# Patient Record
Sex: MALE | URBAN - METROPOLITAN AREA
[De-identification: names, ages, dates, MRNs, and addresses within clinical notes are randomized per-mention and may not be internally consistent; named-entity substitution may affect disease eponyms.]

---

## 2019-04-17 ENCOUNTER — OFFICE VISIT (OUTPATIENT)
Dept: FAMILY MEDICINE | Facility: CLINIC | Age: 34
End: 2019-04-17
Payer: COMMERCIAL

## 2019-04-17 VITALS
DIASTOLIC BLOOD PRESSURE: 84 MMHG | TEMPERATURE: 98.6 F | HEART RATE: 104 BPM | SYSTOLIC BLOOD PRESSURE: 158 MMHG | HEIGHT: 73 IN | BODY MASS INDEX: 31.68 KG/M2 | WEIGHT: 239 LBS

## 2019-04-17 DIAGNOSIS — Z11.3 SCREEN FOR STD (SEXUALLY TRANSMITTED DISEASE): Primary | ICD-10-CM

## 2019-04-17 PROCEDURE — 87491 CHLMYD TRACH DNA AMP PROBE: CPT | Performed by: NURSE PRACTITIONER

## 2019-04-17 PROCEDURE — 99203 OFFICE O/P NEW LOW 30 MIN: CPT | Performed by: NURSE PRACTITIONER

## 2019-04-17 PROCEDURE — 36415 COLL VENOUS BLD VENIPUNCTURE: CPT | Performed by: NURSE PRACTITIONER

## 2019-04-17 PROCEDURE — 87591 N.GONORRHOEAE DNA AMP PROB: CPT | Performed by: NURSE PRACTITIONER

## 2019-04-17 PROCEDURE — 86694 HERPES SIMPLEX NES ANTBDY: CPT | Performed by: NURSE PRACTITIONER

## 2019-04-17 RX ORDER — LISINOPRIL 10 MG/1
TABLET ORAL
COMMUNITY
Start: 2019-01-18

## 2019-04-17 ASSESSMENT — MIFFLIN-ST. JEOR: SCORE: 2087.98

## 2019-04-17 NOTE — PROGRESS NOTES
"  SUBJECTIVE:   Salomón Temple is a 33 year old male who presents to clinic today for the following   health issues:    Concern - Pt is here to establish care and check for STDs.  Not having any sx's, wants to make sure     HPI: Salomón presents today for STD testing. He was in [place] last week and had an unprotected sexual encounter with an individual whose STI status and sexual history isn't entirely clear. He denies obvious symptoms, but he states that he may feel a \"dull ache\" at the tip of his penis, but he is quite nervous and states that this may be in his imagination. He denies sores / ulcers on his genitals, as well as dysuria or any discharge. He also denies fever, chills, or other constitutional symptoms.       Additional history: as documented    Reviewed  and updated as needed this visit by clinical staff  Tobacco  Allergies  Meds  Problems  Med Hx  Surg Hx  Fam Hx  Soc Hx          Reviewed and updated as needed this visit by Provider  Tobacco  Allergies  Meds  Problems  Med Hx  Surg Hx  Fam Hx  Soc Hx          Patient Active Problem List   Diagnosis     Right wrist injury     CARDIOVASCULAR SCREENING; LDL GOAL LESS THAN 160     Warts     Past Surgical History:   Procedure Laterality Date     HERNIA REPAIR      age 11       Social History     Tobacco Use     Smoking status: Never Smoker     Smokeless tobacco: Never Used   Substance Use Topics     Alcohol use: Yes     Comment: 1 drink per month     Family History   Problem Relation Age of Onset     Heart Disease Maternal Grandfather      Dementia Paternal Grandmother      Hypertension Brother            ROS:  Constitutional, HEENT, cardiovascular, pulmonary, GI, , musculoskeletal, neuro, skin, endocrine and psych systems are negative, except as otherwise noted.    OBJECTIVE:     /84   Pulse 104   Temp 98.6  F (37  C) (Tympanic)   Ht 1.854 m (6' 1\")   Wt 108.4 kg (239 lb)   BMI 31.53 kg/m    Body mass index is 31.53 " kg/m .  GENERAL: healthy, alert and no distress   (male): normal male genitalia without lesions or urethral discharge, no hernia or suspicious skin manifestations  SKIN: no suspicious lesions or rashes  NEURO: Normal strength and tone, mentation intact and speech normal  PSYCH: mentation appears normal, affect normal/bright though he is obvious anxious    Diagnostic Test Results:  No results found for this or any previous visit (from the past 24 hour(s)).    ASSESSMENT/PLAN:     Salomón was seen today for establish care and std. New patient. Histories updated. Discussed, at length, the infectious processes of which he is at risk. We also discussed how Hepatitis B, syphilis, and HIV assays would likely return with negative results due to recency of potential exposure. He agrees to follow up after one month should he wish to have these checked. For now, will screen gonorrhea, chlamydia, and HSV IgM. Will follow up with him after the results of these studies are know. Agrees with plan, and all questions answered.     BP elevated, but he states that it is due to nervousness / anxiety surrounding this visit. He states that he checks this at home and reliably gets readings around 130 / 80. Declines offer to intensify his therapy today.     Diagnoses and all orders for this visit:    Screen for STD (sexually transmitted disease)  -     NEISSERIA GONORRHOEA PCR  -     CHLAMYDIA TRACHOMATIS PCR  -     Herpes: HSV IgM antibody      See Patient Instructions    Dennis Jha NP  [facility] [place]

## 2019-04-17 NOTE — PATIENT INSTRUCTIONS
I'll send you a [employer] message with results  Come back for HIV, Hep B, and syphilis, if you wish (at least one month)

## 2019-04-18 LAB
C TRACH DNA SPEC QL NAA+PROBE: NEGATIVE
N GONORRHOEA DNA SPEC QL NAA+PROBE: NEGATIVE
SPECIMEN SOURCE: NORMAL
SPECIMEN SOURCE: NORMAL

## 2019-04-23 LAB — HSV 1+2 IGM SER-IMP: 0.34 INDEX VALUE (ref 0–0.89)

## 2020-02-24 ENCOUNTER — HEALTH MAINTENANCE LETTER (OUTPATIENT)
Age: 35
End: 2020-02-24

## 2020-12-13 ENCOUNTER — HEALTH MAINTENANCE LETTER (OUTPATIENT)
Age: 35
End: 2020-12-13

## 2021-04-17 ENCOUNTER — HEALTH MAINTENANCE LETTER (OUTPATIENT)
Age: 36
End: 2021-04-17

## 2021-09-26 ENCOUNTER — HEALTH MAINTENANCE LETTER (OUTPATIENT)
Age: 36
End: 2021-09-26

## 2022-05-08 ENCOUNTER — HEALTH MAINTENANCE LETTER (OUTPATIENT)
Age: 37
End: 2022-05-08

## 2023-04-23 ENCOUNTER — HEALTH MAINTENANCE LETTER (OUTPATIENT)
Age: 38
End: 2023-04-23